# Patient Record
Sex: MALE | Race: WHITE | NOT HISPANIC OR LATINO | Employment: STUDENT | URBAN - METROPOLITAN AREA
[De-identification: names, ages, dates, MRNs, and addresses within clinical notes are randomized per-mention and may not be internally consistent; named-entity substitution may affect disease eponyms.]

---

## 2023-11-10 PROCEDURE — 99284 EMERGENCY DEPT VISIT MOD MDM: CPT

## 2023-11-11 ENCOUNTER — HOSPITAL ENCOUNTER (EMERGENCY)
Facility: HOSPITAL | Age: 23
Discharge: HOME | End: 2023-11-11
Attending: EMERGENCY MEDICINE

## 2023-11-11 VITALS
WEIGHT: 218.06 LBS | TEMPERATURE: 98 F | OXYGEN SATURATION: 98 % | SYSTOLIC BLOOD PRESSURE: 138 MMHG | DIASTOLIC BLOOD PRESSURE: 72 MMHG | BODY MASS INDEX: 26.55 KG/M2 | HEART RATE: 50 BPM | HEIGHT: 76 IN | RESPIRATION RATE: 16 BRPM

## 2023-11-11 DIAGNOSIS — S52.125A CLOSED NONDISPLACED FRACTURE OF HEAD OF LEFT RADIUS, INITIAL ENCOUNTER: Primary | ICD-10-CM

## 2023-11-11 DIAGNOSIS — S59.909A ELBOW INJURY, INITIAL ENCOUNTER: ICD-10-CM

## 2023-11-11 DIAGNOSIS — M25.529 ELBOW PAIN: ICD-10-CM

## 2023-11-11 PROCEDURE — 63600175 PHARM REV CODE 636 W HCPCS: Performed by: EMERGENCY MEDICINE

## 2023-11-11 PROCEDURE — 96372 THER/PROPH/DIAG INJ SC/IM: CPT | Performed by: EMERGENCY MEDICINE

## 2023-11-11 RX ORDER — KETOROLAC TROMETHAMINE 30 MG/ML
15 INJECTION, SOLUTION INTRAMUSCULAR; INTRAVENOUS
Status: COMPLETED | OUTPATIENT
Start: 2023-11-11 | End: 2023-11-11

## 2023-11-11 RX ORDER — HYDROCODONE BITARTRATE AND ACETAMINOPHEN 5; 325 MG/1; MG/1
1 TABLET ORAL EVERY 6 HOURS PRN
Qty: 12 TABLET | Refills: 0 | Status: SHIPPED | OUTPATIENT
Start: 2023-11-11

## 2023-11-11 RX ADMIN — KETOROLAC TROMETHAMINE 15 MG: 30 INJECTION INTRAMUSCULAR; INTRAVENOUS at 05:11

## 2023-11-11 NOTE — ED PROVIDER NOTES
"Encounter Date: 11/10/2023       History     Chief Complaint   Patient presents with    Arm Injury     Slipped and fell, tried to catch self with left arm, " I felt it buckle"  Swelling to left elbow.       23-year-old male no pertinent past medical history but today after fall.  Patient reports he accidentally tripped and fell and landed onto his left arm.  He reports pain around his left elbow.  Did not hit his head.  Did not lose consciousness.  Denies any head or neck pain.  Denies any anticoagulation.  Patient reports it hurts to move his left arm.  He is able to move his wrist and hand without problem.  He is also able to move his shoulder but has pain with range of motion at his elbow.  Patient denies any numbness or tingling.    The history is provided by the patient. No  was used.     Review of patient's allergies indicates:  No Known Allergies  Past Medical History:   Diagnosis Date    ADD (attention deficit disorder with hyperactivity)      History reviewed. No pertinent surgical history.  History reviewed. No pertinent family history.  Social History     Tobacco Use    Smoking status: Every Day     Types: Cigarettes    Smokeless tobacco: Never   Substance Use Topics    Alcohol use: Yes    Drug use: Yes     Types: Marijuana     Review of Systems    Physical Exam     Initial Vitals [11/10/23 2350]   BP Pulse Resp Temp SpO2   130/74 74 16 97.8 °F (36.6 °C) 100 %      MAP       --         Physical Exam    Nursing note and vitals reviewed.  Constitutional: He appears well-developed. No distress.   HENT:   Head: Normocephalic and atraumatic.   Nose: Nose normal.   Eyes: EOM are normal.   Neck: Neck supple. No tracheal deviation present. No JVD present.   Cardiovascular:  Normal rate, regular rhythm, normal heart sounds and intact distal pulses.     Exam reveals no gallop and no friction rub.       No murmur heard.  Pulmonary/Chest: Breath sounds normal. No respiratory distress. He has no " wheezes. He has no rhonchi. He has no rales.   Abdominal: Abdomen is soft. Bowel sounds are normal. He exhibits no distension. There is no abdominal tenderness. There is no rebound and no guarding.   Musculoskeletal:         General: Tenderness and edema present.      Cervical back: Neck supple.      Comments: Decreased range of motion the elbow secondary to pain.  Tenderness over the elbow with very minimal swelling.  No overlying lacerations or bruising.  Full range of motion of the shoulder wrist and hand.  Neurovascular exam distally in tact per routine  Compartments surrounding are soft         Neurological: He is alert and oriented to person, place, and time. He has normal strength. No cranial nerve deficit or sensory deficit.   Skin: Skin is warm and dry. Capillary refill takes less than 2 seconds. No rash noted.   Psychiatric: He has a normal mood and affect.         ED Course   Procedures  Labs Reviewed - No data to display       Imaging Results              X-Ray Humerus 2 View Left (Final result)  Result time 11/11/23 07:55:52      Final result by Julio Rock MD (11/11/23 07:55:52)                   Narrative:    EXAMINATION:  XR HUMERUS 2 VIEW LEFT; XR ELBOW COMPLETE 3 VIEW LEFT    CLINICAL HISTORY:  Unspecified injury of unspecified elbow, initial encounter; Pain in unspecified elbow    TECHNIQUE:  Frontal and lateral views of the left humerus.  Frontal oblique and lateral views of the left elbow.    COMPARISON:  None    FINDINGS:  No displaced fracture or traumatic malalignment.  Query a bone island proximal humerus.  Preserved joint spaces.  No elbow joint effusion.      Electronically signed by: Julio Rock  Date:    11/11/2023  Time:    07:55                                     X-Ray Elbow Complete Left (Final result)  Result time 11/11/23 07:55:52      Final result by Julio Rock MD (11/11/23 07:55:52)                   Narrative:    EXAMINATION:  XR HUMERUS 2 VIEW LEFT; XR  ELBOW COMPLETE 3 VIEW LEFT    CLINICAL HISTORY:  Unspecified injury of unspecified elbow, initial encounter; Pain in unspecified elbow    TECHNIQUE:  Frontal and lateral views of the left humerus.  Frontal oblique and lateral views of the left elbow.    COMPARISON:  None    FINDINGS:  No displaced fracture or traumatic malalignment.  Query a bone island proximal humerus.  Preserved joint spaces.  No elbow joint effusion.      Electronically signed by: Julio Rock  Date:    11/11/2023  Time:    07:55                                     Medications   ketorolac injection 15 mg (15 mg Intramuscular Given 11/11/23 0516)     Medical Decision Making  Workup: XR elbow  Findings:  Impacted radial head fracture    Patient does not currently demonstrate complications of fracture such as compartment syndrome, arterial or nerve injury.  The fracture has been satisfactorily immobilized, and the patient has been given appropriate analgesia.    Disposition: Discharge with strict return precautions and instructions to follow up with primary MD within 24-48 hours for further evaluation including referral to an orthopedist.    Amount and/or Complexity of Data Reviewed  Radiology: ordered. Decision-making details documented in ED Course.    Risk  Prescription drug management.               ED Course as of 11/11/23 1609   Sat Nov 11, 2023   0335 X-Ray Elbow Complete Left  IMPRESSION:  No acute findings.  Dictated and Authenticated by: Kenneth Frances MD  11/11/2023 3:32 AM Central Time ([place] & [place]) [BD]      ED Course User Index  [BD] Jeremy Shaw MD                    Clinical Impression:   Final diagnoses:  [M25.529] Elbow pain  [S59.909A] Elbow injury, initial encounter  [S52.125A] Closed nondisplaced fracture of head of left radius, initial encounter (Primary)        ED Disposition Condition    Discharge Stable          ED Prescriptions       Medication Sig Dispense Start Date End Date Auth. Provider     HYDROcodone-acetaminophen (NORCO) 5-325 mg per tablet Take 1 tablet by mouth every 6 (six) hours as needed for Pain. 12 tablet 11/11/2023 -- Jeremy Shaw MD          Follow-up Information       Follow up With Specialties Details Why Contact Info    Cong Smalls MD Sports Medicine, Orthopedic Surgery Go in 1 week  [address] [place] [address] [address]  [facility] [address]  [phone]      Fernie Sosa MD Family Medicine Go in 2 days  [address] [place] [address]  [phone]               Jeremy Shaw MD  11/11/23 2352